# Patient Record
(demographics unavailable — no encounter records)

---

## 2019-12-02 NOTE — RAD
3 views left knee dated 12/2/2019.

 

No comparison available.

 

Clinical data indication: Pain.

 

FINDINGS:

 

3 views left knee show normal bony alignment. No displaced fracture. No 

acute osseous or articular abnormality. No apparent joint effusion or 

loose body.

 

IMPRESSION:

 

No acute findings.

 

Electronically signed by: Marcus Gandara MD (12/2/2019 5:22 PM) 

[id number]

## 2019-12-12 NOTE — [facility]
MR of the left knee

 

HISTORY: Left knee pain for 3 weeks. Twisting injury.

 

TECHNIQUE: Routine multiplanar sequences are obtained.

 

FINDINGS:

No evidence of medial or lateral meniscal tear. Anterior and posterior 

cruciate ligaments are intact. Medial collateral ligament intact. 

Iliotibial band unremarkable. Fibular collateral ligament, biceps femoris 

tendon and popliteus tendon are intact. Extensor mechanism is intact.

 

No significant joint effusion. Moderate chondromalacia of the patella. 

Medial compartment articular cartilage is intact. Chondromalacia at the 

posterior lateral tibial plateau at least moderate.

 

No acute fracture. No aggressive bone destruction. No significant Baker's 

cyst.

 

IMPRESSION:

1. No evidence of meniscal tear or internal derangement.

2. Degenerative joint disease.

 

Electronically signed by: Marcus Beltran MD (12/12/2019 2:32 PM) [id number]